# Patient Record
Sex: FEMALE | URBAN - METROPOLITAN AREA
[De-identification: names, ages, dates, MRNs, and addresses within clinical notes are randomized per-mention and may not be internally consistent; named-entity substitution may affect disease eponyms.]

---

## 2021-04-13 ENCOUNTER — OFFICE VISIT (OUTPATIENT)
Dept: URBAN - METROPOLITAN AREA CLINIC 92 | Facility: CLINIC | Age: 76
End: 2021-04-13

## 2022-11-01 ENCOUNTER — OFFICE VISIT (OUTPATIENT)
Dept: URBAN - METROPOLITAN AREA CLINIC 92 | Facility: CLINIC | Age: 77
End: 2022-11-01

## 2022-12-19 ENCOUNTER — OFFICE VISIT (OUTPATIENT)
Dept: URBAN - METROPOLITAN AREA CLINIC 92 | Facility: CLINIC | Age: 77
End: 2022-12-19

## 2023-01-18 ENCOUNTER — OFFICE VISIT (OUTPATIENT)
Dept: URBAN - METROPOLITAN AREA CLINIC 92 | Facility: CLINIC | Age: 78
End: 2023-01-18

## 2023-09-11 ENCOUNTER — WEB ENCOUNTER (OUTPATIENT)
Dept: URBAN - METROPOLITAN AREA CLINIC 92 | Facility: CLINIC | Age: 78
End: 2023-09-11

## 2023-09-11 ENCOUNTER — LAB OUTSIDE AN ENCOUNTER (OUTPATIENT)
Dept: URBAN - METROPOLITAN AREA CLINIC 92 | Facility: CLINIC | Age: 78
End: 2023-09-11

## 2023-09-11 ENCOUNTER — OFFICE VISIT (OUTPATIENT)
Dept: URBAN - METROPOLITAN AREA CLINIC 92 | Facility: CLINIC | Age: 78
End: 2023-09-11
Payer: MEDICARE

## 2023-09-11 ENCOUNTER — DASHBOARD ENCOUNTERS (OUTPATIENT)
Age: 78
End: 2023-09-11

## 2023-09-11 VITALS
SYSTOLIC BLOOD PRESSURE: 195 MMHG | BODY MASS INDEX: 27.72 KG/M2 | HEART RATE: 100 BPM | TEMPERATURE: 98.4 F | DIASTOLIC BLOOD PRESSURE: 102 MMHG | WEIGHT: 162.4 LBS | HEIGHT: 64 IN

## 2023-09-11 DIAGNOSIS — Z12.11 COLON CANCER SCREENING: ICD-10-CM

## 2023-09-11 PROCEDURE — 99202 OFFICE O/P NEW SF 15 MIN: CPT | Performed by: INTERNAL MEDICINE

## 2023-09-11 RX ORDER — TRIAMTERENE AND HYDROCHLOROTHIAZIDE 37.5; 25 MG/1; MG/1
1 TABLET IN THE MORNING TABLET ORAL ONCE A DAY
Status: ACTIVE | COMMUNITY

## 2023-09-11 RX ORDER — LOSARTAN POTASSIUM 25 MG/1
1 TABLET TABLET ORAL ONCE A DAY
Status: ACTIVE | COMMUNITY

## 2023-09-11 RX ORDER — IBUPROFEN 800 MG/1
1 TABLET WITH FOOD OR MILK AS NEEDED TABLET, FILM COATED ORAL
Status: ACTIVE | COMMUNITY

## 2023-09-11 NOTE — HPI-TODAY'S VISIT:
[id number] with a hx of HTN who presents to discuss CRC screening. has never had a colonoscopy, no fam hx of CRC.

## 2023-09-15 ENCOUNTER — OFFICE VISIT (OUTPATIENT)
Dept: URBAN - METROPOLITAN AREA SURGERY CENTER 16 | Facility: SURGERY CENTER | Age: 78
End: 2023-09-15

## 2023-09-15 RX ORDER — IBUPROFEN 800 MG/1
1 TABLET WITH FOOD OR MILK AS NEEDED TABLET, FILM COATED ORAL
Status: ACTIVE | COMMUNITY

## 2023-09-15 RX ORDER — LOSARTAN POTASSIUM 25 MG/1
1 TABLET TABLET ORAL ONCE A DAY
Status: ACTIVE | COMMUNITY

## 2023-09-15 RX ORDER — TRIAMTERENE AND HYDROCHLOROTHIAZIDE 37.5; 25 MG/1; MG/1
1 TABLET IN THE MORNING TABLET ORAL ONCE A DAY
Status: ACTIVE | COMMUNITY